# Patient Record
(demographics unavailable — no encounter records)

---

## 2024-10-14 NOTE — REVIEW OF SYSTEMS
[Cough] : cough [Obesity] : obesity [Fever] : no fever [Fatigue] : no fatigue [Recent Wt Gain (___ Lbs)] : ~T no recent weight gain [Chills] : no chills [Recent Wt Loss (___ Lbs)] : ~T no recent weight loss [Sore Throat] : no sore throat [Nasal Congestion] : no nasal congestion [Dry Mouth] : no dry mouth [Sinus Problems] : no sinus problems [Hemoptysis] : no hemoptysis [Chest Tightness] : no chest tightness [Sputum] : no sputum [Dyspnea] : no dyspnea [Wheezing] : no wheezing [Chest Discomfort] : no chest discomfort [Palpitations] : no palpitations [Diabetes] : no diabetes [Thyroid Problem] : no thyroid problem

## 2024-10-14 NOTE — PHYSICAL EXAM
[No Acute Distress] : no acute distress [No Deformities] : no deformities [IV] : Mallampati Class: IV [Normal Appearance] : normal appearance [Supple] : supple [No Neck Mass] : no neck mass [No JVD] : no jvd [Normal Rate/Rhythm] : normal rate/rhythm [Normal S1, S2] : normal s1, s2 [No Murmurs] : no murmurs [No Resp Distress] : no resp distress [No Acc Muscle Use] : no acc muscle use [Clear to Auscultation Bilaterally] : clear to auscultation bilaterally [Kyphosis] : kyphosis [Benign] : benign [Not Tender] : not tender [No Hernias] : no hernias [Normal Gait] : normal gait [No Clubbing] : no clubbing [No Edema] : no edema [No Rash] : no rash [No Motor Deficits] : no motor deficits [Normal Affect] : normal affect [TextBox_2] : pleasant f no distress no cough no sob  [TextBox_11] : crowded no lesion moist

## 2024-10-14 NOTE — ASSESSMENT
[FreeTextEntry1] : 70y/o   never smoker  1-   asthma  likely  / BMI  34.7 / celiac   dz hx   reactive airways 2- post covid   cough   now improved 3- allergic   +  with drug  senstivitiy   ? airspra  4- vaccinations per primary    Recommendations 1- trial nebulizer  2- trial  1/2  dose  albuterol to start 3- d/c airsupra  4- PFT then  niox next visit 5- cxr told clear  to get copy and  ct ordered    discussion medications reviewed and side effects of meds

## 2024-10-14 NOTE — HISTORY OF PRESENT ILLNESS
[Never] : never [TextBox_4] : fist visit  never seen by pulm   10/14/2024 72y/o female  born   in [place]  never smoker  ( second hand smoker )  retired    h/o   - sept   had covid now  with  sore throat  and  wheezing  / congestion   fever one day --   urgent care - +   took Paxlovid   only tolerated one day then hallucinations  - developed later with  cough and wheezing  -   medical   MD   -   treated  with   airsupra      - last covid 2022 no  issues   just mild -  -allergies     + histamine   + grasses   jamilah    mold  cotton wooked -feels like  airsupra made her cough more - has had increase in sensitivity to medciations -

## 2024-10-14 NOTE — HISTORY OF PRESENT ILLNESS
[Never] : never [TextBox_4] : fist visit  never seen by pulm   10/14/2024 70y/o female  born   in [place]  never smoker  ( second hand smoker )  retired    h/o   - sept   had covid now  with  sore throat  and  wheezing  / congestion   fever one day --   urgent care - +   took Paxlovid   only tolerated one day then hallucinations  - developed later with  cough and wheezing  -   medical   MD   -   treated  with   airsupra      - last covid 2022 no  issues   just mild -  -allergies     + histamine   + grasses   jamilah    mold  cotton wooked -feels like  airsupra made her cough more - has had increase in sensitivity to medciations -